# Patient Record
Sex: MALE | Race: WHITE | NOT HISPANIC OR LATINO | URBAN - METROPOLITAN AREA
[De-identification: names, ages, dates, MRNs, and addresses within clinical notes are randomized per-mention and may not be internally consistent; named-entity substitution may affect disease eponyms.]

---

## 2021-01-14 ENCOUNTER — EMERGENCY (EMERGENCY)
Facility: HOSPITAL | Age: 61
LOS: 1 days | Discharge: HOME | End: 2021-01-14
Admitting: EMERGENCY MEDICINE
Payer: COMMERCIAL

## 2021-01-14 VITALS
RESPIRATION RATE: 16 BRPM | SYSTOLIC BLOOD PRESSURE: 129 MMHG | DIASTOLIC BLOOD PRESSURE: 76 MMHG | HEIGHT: 71 IN | WEIGHT: 190.04 LBS | TEMPERATURE: 99 F | OXYGEN SATURATION: 97 % | HEART RATE: 80 BPM

## 2021-01-14 DIAGNOSIS — Z20.822 CONTACT WITH AND (SUSPECTED) EXPOSURE TO COVID-19: ICD-10-CM

## 2021-01-14 PROCEDURE — 99283 EMERGENCY DEPT VISIT LOW MDM: CPT

## 2021-01-14 NOTE — ED PROVIDER NOTE - NSFOLLOWUPINSTRUCTIONS_ED_ALL_ED_FT
IF YOU DO NOT GET YOUR RESULTS WITHIN 48 HOURS PLEASE CALL [phone].    IF YOUR RESULT COMES BACK POSITIVE:    1. STAY HOME for 14 DAYS  2. Minimize Human contact to ONLY ESSENTIAL  3. Every time you wash your hands, sing the HAPPY BIRTHDAY Song so you know you're washing long enough.  Make sure to scrub the webspace between your fingers.  4. DRINK 1-3 Liters of fluids day x at least 5 days.  To remain hydrated. Your fatigue, lightheadedness, and body aches will decrease and your fever has a better chance of breaking if you are well hydrated.    5. For your Fever and Body aches takes Tylenol 650-100mg every 4-6h (max 4000mg/day). Try not to use ibuprofen, aspirin or naproxen (Advil, Motrin or Aleve) as these may worsen Coronavirus infection.  6. RETURN TO THE ER IMMEDIATELY IF YOU HAVE WORSENING SHORTNESS OF BREATH. SYMPTOMS USUALLY PEAK BETWEEN DAY 7-10.

## 2021-01-14 NOTE — ED ADULT TRIAGE NOTE - CHIEF COMPLAINT QUOTE
Patient presents for COVID testing. Doing the test for precaution. Patient has slight sore throat x 2 days.

## 2021-01-14 NOTE — ED PROVIDER NOTE - PATIENT PORTAL LINK FT
You can access the FollowMyHealth Patient Portal offered by [facility] by registering at the following website: [email or web address]. By joining [employer]’s FollowMyHealth portal, you will also be able to view your health information using other applications (apps) compatible with our system.

## 2021-01-15 LAB — SARS-COV-2 RNA SPEC QL NAA+PROBE: SIGNIFICANT CHANGE UP

## 2022-06-28 PROBLEM — Z78.9 OTHER SPECIFIED HEALTH STATUS: Chronic | Status: ACTIVE | Noted: 2021-01-14

## 2022-09-06 PROBLEM — Z00.00 ENCOUNTER FOR PREVENTIVE HEALTH EXAMINATION: Status: ACTIVE | Noted: 2022-09-06

## 2022-09-07 ENCOUNTER — APPOINTMENT (OUTPATIENT)
Dept: PULMONOLOGY | Facility: CLINIC | Age: 62
End: 2022-09-07

## 2022-09-07 VITALS
DIASTOLIC BLOOD PRESSURE: 83 MMHG | BODY MASS INDEX: 28 KG/M2 | SYSTOLIC BLOOD PRESSURE: 129 MMHG | OXYGEN SATURATION: 99 % | TEMPERATURE: 98.4 F | WEIGHT: 200 LBS | HEART RATE: 53 BPM | HEIGHT: 71 IN

## 2022-09-07 DIAGNOSIS — Z87.898 PERSONAL HISTORY OF OTHER SPECIFIED CONDITIONS: ICD-10-CM

## 2022-09-07 DIAGNOSIS — Z86.39 PERSONAL HISTORY OF OTHER ENDOCRINE, NUTRITIONAL AND METABOLIC DISEASE: ICD-10-CM

## 2022-09-07 PROCEDURE — 99203 OFFICE O/P NEW LOW 30 MIN: CPT

## 2022-09-07 NOTE — PHYSICAL EXAM
[No Acute Distress] : no acute distress [Normal Oropharynx] : normal oropharynx [Normal Appearance] : normal appearance [Normal Rate/Rhythm] : normal rate/rhythm [Normal S1, S2] : normal s1, s2 [No Murmurs] : no murmurs [No Resp Distress] : no resp distress [Clear to Auscultation Bilaterally] : clear to auscultation bilaterally [Normal Gait] : normal gait [No Clubbing] : no clubbing [Oriented x3] : oriented x3 [Normal Affect] : normal affect

## 2022-09-08 ENCOUNTER — TRANSCRIPTION ENCOUNTER (OUTPATIENT)
Age: 62
End: 2022-09-08

## 2022-09-09 ENCOUNTER — TRANSCRIPTION ENCOUNTER (OUTPATIENT)
Age: 62
End: 2022-09-09

## 2022-09-09 PROBLEM — Z87.898 HISTORY OF INSOMNIA: Status: RESOLVED | Noted: 2022-09-07 | Resolved: 2022-09-09

## 2022-09-09 PROBLEM — Z86.39 HISTORY OF HYPERTHYROIDISM: Status: RESOLVED | Noted: 2022-09-09 | Resolved: 2022-09-09

## 2022-09-09 NOTE — CONSULT LETTER
[Dear  ___] : Dear  [unfilled], [Consult Letter:] : I had the pleasure of evaluating your patient, [unfilled]. [Please see my note below.] : Please see my note below. [Consult Closing:] : Thank you very much for allowing me to participate in the care of this patient.  If you have any questions, please do not hesitate to contact me. [Sincerely,] : Sincerely, [FreeTextEntry3] : Emily Ribera MD\par \par [employer] [facility]\par Pulmonary, Critical Care, and Sleep Medicine\par

## 2022-09-09 NOTE — HISTORY OF PRESENT ILLNESS
[Never] : never [Obstructive Sleep Apnea] : obstructive sleep apnea [Insomnia] : insomnia [Awakes Unrefreshed] : awakes unrefreshed [Difficulty Maintaining Sleep] : difficulty maintaining sleep [Fatigue] : fatigue [Frequent Nocturnal Awakening] : frequent nocturnal awakening [Nonrestorative Sleep] : nonrestorative sleep [TextBox_4] : Mr. Hooper is a 61 y/o male with insomnia who presents to the clinic for sleep evaluation. He states he has no trouble initiating sleep usually but he often wakes up in 3-4 hours and has a hard time falling back asleep. He states he uses a CPAP machine because although he was not  formally diagnosed with THANH ( insurance wouldn't cover sleep study) he obtained a CPAP from his sister who had an extra one which noticed helped him a lot; sister has THANH. However he believes his current issues are not associated with his sleep apnea because he has been using the CPAP for several years now. He admits to 2-3 drinks of wine a night. He also uses ambien about once a week. \par \par DME none\par Machine DS1\par Mask  [ESS] : 3

## 2022-09-09 NOTE — ASSESSMENT
[FreeTextEntry1] : Mr. Hooper is a 63 y/o male with insomnia who presents to the clinic for sleep evaluation; referred by Dr. CHELSEY Frye.\par \par \par #Insomnia\par #Suspected Sleep Apnea\par \par Discussed with patient that his symptoms may be due to uncontrolled sleep apnea, another sleep disorder or both. Extensively discussed sleep hygiene with patient including winding down before bed, removing electronic devices from the bedroom, limiting caffeine and alcohol intake, and leaving the bed if he's unable to fall asleep within 30 minutes. Has a recalled Dreamstation 1 machine; is registered with [employer]. Spoke to patient regarding recall. Discussed the theoretical risk of airway damage and cancer from the sound reducing foam and off-gasing. Options include buying ResMed AirSense 10 out of pocket, continuing to use current machine despite the recall and stopping use of PAP machine altogether until Anders replaces his machine. We can also consider other treatment options such as MAD and CN12 stimulation but he would need to have sleep apnea testing to evaluate THANH severity. He will send me his machine information so I can see if it can be linked on Care . Follow up in 6 weeks.

## 2022-10-26 ENCOUNTER — APPOINTMENT (OUTPATIENT)
Dept: PULMONOLOGY | Facility: CLINIC | Age: 62
End: 2022-10-26

## 2022-10-26 VITALS
OXYGEN SATURATION: 97 % | HEART RATE: 64 BPM | SYSTOLIC BLOOD PRESSURE: 119 MMHG | DIASTOLIC BLOOD PRESSURE: 79 MMHG | BODY MASS INDEX: 28 KG/M2 | HEIGHT: 71 IN | TEMPERATURE: 97.3 F | WEIGHT: 200 LBS

## 2022-10-26 DIAGNOSIS — R29.818 OTHER SYMPTOMS AND SIGNS INVOLVING THE NERVOUS SYSTEM: ICD-10-CM

## 2022-10-26 DIAGNOSIS — G47.00 INSOMNIA, UNSPECIFIED: ICD-10-CM

## 2022-10-26 PROCEDURE — 99213 OFFICE O/P EST LOW 20 MIN: CPT

## 2022-10-27 PROBLEM — G47.00 INSOMNIA: Status: ACTIVE | Noted: 2022-09-07

## 2022-10-27 PROBLEM — R29.818 SUSPECTED SLEEP APNEA: Status: ACTIVE | Noted: 2022-09-07

## 2022-10-27 NOTE — ASSESSMENT
[FreeTextEntry1] : REVIEWED\par Data from his DS1\par 30/30 day use\par average use of 7 hours 4 minutes\par therapeutic AHI 3.4 \par 95th percentile pressure 6.8 cm H2O\par \par 61yo with insomnia and THANH. Cannot link his machine to my account as he has no DME; will need to get manual data off his machine. He is adherent and is at optimal efficacy. Middle of the night insomnia is behavior driven; advised him to read something "mindless" and not intellectually stimulating or find something else that is a relaxing activity. He is still pending a new replacement machine from [employer] and is aware of the recall; see 9/7/2022 note. Follow up in 3 to 6 months, depending on need.

## 2022-10-27 NOTE — HISTORY OF PRESENT ILLNESS
[Never] : never [TextBox_4] : Mr. Hooper is a 63 y/o male with insomnia who presents to the clinic for sleep evaluation. He states he has no trouble initiating sleep usually but he often wakes up in 3-4 hours and has a hard time falling back asleep. He states he uses a CPAP machine because although he was not  formally diagnosed with THANH ( insurance wouldn't cover sleep study) he obtained a CPAP from his sister who had an extra one which noticed helped him a lot; sister has THANH. However he believes his current issues are not associated with his sleep apnea because he has been using the CPAP for several years now. He admits to 2-3 drinks of wine a night. He also uses ambien about once a week. \par \par 10/26/2022\par Has continued PAP use. Still wakes up in the middle of the night; takes about 15 minutes; then gets up and reads the Economist. Will sometimes take a small dose of Ambien. \par \par DME none\par Machine DS1\par Mask  [ESS] : 3

## 2022-10-27 NOTE — CONSULT LETTER
[Dear  ___] : Dear  [unfilled], [Courtesy Letter:] : I had the pleasure of seeing your patient, [unfilled], in my office today. [Please see my note below.] : Please see my note below. [Consult Closing:] : Thank you very much for allowing me to participate in the care of this patient.  If you have any questions, please do not hesitate to contact me. [Sincerely,] : Sincerely, [FreeTextEntry3] : Emily Ribera MD\par \par [employer] at [facility]\par Pulmonary, Critical Care, and Sleep Medicine\par

## 2022-12-02 ENCOUNTER — TRANSCRIPTION ENCOUNTER (OUTPATIENT)
Age: 62
End: 2022-12-02

## 2022-12-06 ENCOUNTER — TRANSCRIPTION ENCOUNTER (OUTPATIENT)
Age: 62
End: 2022-12-06